# Patient Record
Sex: MALE | Race: WHITE | NOT HISPANIC OR LATINO
[De-identification: names, ages, dates, MRNs, and addresses within clinical notes are randomized per-mention and may not be internally consistent; named-entity substitution may affect disease eponyms.]

---

## 2024-04-05 ENCOUNTER — NON-APPOINTMENT (OUTPATIENT)
Age: 89
End: 2024-04-05

## 2024-04-05 DIAGNOSIS — Z86.79 PERSONAL HISTORY OF OTHER DISEASES OF THE CIRCULATORY SYSTEM: ICD-10-CM

## 2024-04-05 DIAGNOSIS — L84 CORNS AND CALLOSITIES: ICD-10-CM

## 2024-04-05 DIAGNOSIS — M79.672 PAIN IN RIGHT FOOT: ICD-10-CM

## 2024-04-05 DIAGNOSIS — Z85.9 PERSONAL HISTORY OF MALIGNANT NEOPLASM, UNSPECIFIED: ICD-10-CM

## 2024-04-05 DIAGNOSIS — M79.674 PAIN IN RIGHT TOE(S): ICD-10-CM

## 2024-04-05 DIAGNOSIS — M79.671 PAIN IN RIGHT FOOT: ICD-10-CM

## 2024-04-05 DIAGNOSIS — M79.675 PAIN IN LEFT TOE(S): ICD-10-CM

## 2024-04-05 PROBLEM — Z00.00 ENCOUNTER FOR PREVENTIVE HEALTH EXAMINATION: Status: ACTIVE | Noted: 2024-04-05

## 2024-04-05 RX ORDER — CLOPIDOGREL 75 MG/1
TABLET, FILM COATED ORAL
Refills: 0 | Status: ACTIVE | COMMUNITY

## 2024-04-05 RX ORDER — METOPROLOL SUCCINATE 100 MG/1
TABLET, EXTENDED RELEASE ORAL
Refills: 0 | Status: ACTIVE | COMMUNITY

## 2024-04-05 RX ORDER — LISINOPRIL 30 MG/1
TABLET ORAL
Refills: 0 | Status: ACTIVE | COMMUNITY

## 2024-04-05 RX ORDER — SIMVASTATIN 40 MG/1
TABLET, FILM COATED ORAL
Refills: 0 | Status: ACTIVE | COMMUNITY

## 2024-05-29 ENCOUNTER — APPOINTMENT (OUTPATIENT)
Dept: PODIATRY | Facility: CLINIC | Age: 89
End: 2024-05-29
Payer: MEDICARE

## 2024-05-29 DIAGNOSIS — I70.203 UNSPECIFIED ATHEROSCLEROSIS OF NATIVE ARTERIES OF EXTREMITIES, BILATERAL LEGS: ICD-10-CM

## 2024-05-29 DIAGNOSIS — B35.1 TINEA UNGUIUM: ICD-10-CM

## 2024-05-29 PROCEDURE — 11721 DEBRIDE NAIL 6 OR MORE: CPT | Mod: Q9,59

## 2024-05-29 PROCEDURE — 11056 PARNG/CUTG B9 HYPRKR LES 2-4: CPT | Mod: Q9,59

## 2024-05-29 NOTE — REASON FOR VISIT
[Follow-Up Visit] : a follow-up visit for [FreeTextEntry2] : painful calluses and painful thick and long toe nails

## 2024-05-29 NOTE — PHYSICAL EXAM
[General Appearance - Alert] : alert [General Appearance - In No Acute Distress] : in no acute distress [General Appearance - Well Nourished] : well nourished [de-identified] : No pain on palpation of feet b/l, no pain on ROM of foot and ankle b/l, no structural deformities noted b/l [FreeTextEntry1] : Callus located on his plantar lateral aspect of right heel and plantar medial aspect of left heel, hard and dry. Pain: subject to breakdown. There are 10 mycotic nails located on toenails 1-10, brittle, crumbly, each is discolored and thickened. Pain: On palpation. Class findings (Q9) indicated due to hair growth decreased or absent, nail changes (thickening), pigmentary changes (discoloration), skin texture L (thin, shiny), skin texture R (thin, shiny), temperature changes and edema.

## 2024-05-29 NOTE — ASSESSMENT
[FreeTextEntry1] : Examination. Plantar lateral aspect of right heel and plantar medial aspect of left heel keratotic lesions were debrided. Toenails 1-10 fungal toe nails were debrided using manual cutters and electrical  to patient tolerance. Patient to return in 9 weeks.

## 2024-05-29 NOTE — HISTORY OF PRESENT ILLNESS
[FreeTextEntry1] : 89 year old male patient presents to the office today for continued care of calluses, and painful toe nails that are thick, long and difficult to cut. He states the toe nails and calluses cause him pain in shoes. Callus Pain: Subject to breakdown. Mycotic Nail Pain: On palpation.

## 2024-07-08 ENCOUNTER — NON-APPOINTMENT (OUTPATIENT)
Age: 89
End: 2024-07-08

## 2024-07-08 DIAGNOSIS — L60.3 NAIL DYSTROPHY: ICD-10-CM

## 2024-08-01 ENCOUNTER — APPOINTMENT (OUTPATIENT)
Dept: PODIATRY | Facility: CLINIC | Age: 89
End: 2024-08-01

## 2024-08-08 ENCOUNTER — APPOINTMENT (OUTPATIENT)
Dept: PODIATRY | Facility: CLINIC | Age: 89
End: 2024-08-08

## 2024-08-08 PROCEDURE — 11721 DEBRIDE NAIL 6 OR MORE: CPT | Mod: 59,Q9

## 2024-08-08 PROCEDURE — 11056 PARNG/CUTG B9 HYPRKR LES 2-4: CPT | Mod: 59,Q9

## 2024-08-08 NOTE — HISTORY OF PRESENT ILLNESS
[FreeTextEntry1] : 89 year old male patient presents to the office with step son today for follow up care of calluses, and painful toe nails that are thick, long and difficult to cut. He states he is a little overdue for his appointment as her got sick recently and it has caused him to have difficulties with his COPD. He states his toe nails and calluses cause him pain in shoes, and he is having a lot pf pain to a callus on his left forefoot. Callus Pain: Subject to breakdown. Mycotic Nail Pain: On palpation.

## 2024-08-08 NOTE — PHYSICAL EXAM
[General Appearance - Alert] : alert [General Appearance - In No Acute Distress] : in no acute distress [General Appearance - Well Nourished] : well nourished [de-identified] : No pain on palpation of feet b/l, no pain on ROM of foot and ankle b/l. No structural deformities noted b/l. [FreeTextEntry1] : Callus located on his plantar lateral aspect of right heel and plantar medial aspect of left heel and sub met 5, hard and dry. Pain: subject to breakdown. There are 10 mycotic nails located on toenails 1-10, brittle, crumbly, each is discolored and thickened. Pain: On palpation. Class findings (Q9) indicated due to hair growth decreased or absent, nail changes (thickening), pigmentary changes (discoloration), skin texture L (thin, shiny), skin texture R (thin, shiny), temperature changes and edema.

## 2024-08-08 NOTE — ASSESSMENT
[FreeTextEntry1] : Exam. Plantar lateral aspect of right heel and plantar medial aspect of left heel keratotic lesions were debrided with sterile #10 blade, patient tolerated well. Toenails 1-10 fungal toe nails were debrided using manual cutters and electrical  to patient tolerance. Patient to return in 9 weeks.

## 2024-10-15 ENCOUNTER — APPOINTMENT (OUTPATIENT)
Dept: PODIATRY | Facility: CLINIC | Age: 89
End: 2024-10-15